# Patient Record
Sex: MALE | Race: WHITE | Employment: FULL TIME | URBAN - METROPOLITAN AREA
[De-identification: names, ages, dates, MRNs, and addresses within clinical notes are randomized per-mention and may not be internally consistent; named-entity substitution may affect disease eponyms.]

---

## 2024-01-19 ENCOUNTER — HOSPITAL ENCOUNTER (EMERGENCY)
Facility: HOSPITAL | Age: 34
Discharge: HOME | End: 2024-01-19
Attending: INTERNAL MEDICINE
Payer: COMMERCIAL

## 2024-01-19 VITALS
RESPIRATION RATE: 18 BRPM | DIASTOLIC BLOOD PRESSURE: 91 MMHG | OXYGEN SATURATION: 100 % | SYSTOLIC BLOOD PRESSURE: 166 MMHG | TEMPERATURE: 98.4 F | BODY MASS INDEX: 27.47 KG/M2 | HEIGHT: 67 IN | HEART RATE: 64 BPM | WEIGHT: 175 LBS
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
## 2024-01-19 PROCEDURE — 99283 EMERGENCY DEPT VISIT LOW MDM: CPT | Performed by: INTERNAL MEDICINE

## 2024-01-19 PROCEDURE — 2500000001 HC RX 250 WO HCPCS SELF ADMINISTERED DRUGS (ALT 637 FOR MEDICARE OP): Performed by: INTERNAL MEDICINE

## 2024-01-19 RX ORDER — METRONIDAZOLE 500 MG/1
500 TABLET ORAL ONCE
Status: COMPLETED | OUTPATIENT
Start: 2024-01-19 | End: 2024-01-19

## 2024-01-19 RX ORDER — DOXYCYCLINE HYCLATE 100 MG
100 TABLET ORAL ONCE
Status: COMPLETED | OUTPATIENT
Start: 2024-01-19 | End: 2024-01-19

## 2024-01-19 RX ORDER — DOXYCYCLINE 100 MG/1
100 CAPSULE ORAL 2 TIMES DAILY
Qty: 20 CAPSULE | Refills: 0 | Status: SHIPPED | OUTPATIENT
Start: 2024-01-19 | End: 2024-01-29

## 2024-01-19 RX ORDER — METRONIDAZOLE 500 MG/1
500 TABLET ORAL 3 TIMES DAILY
Qty: 30 TABLET | Refills: 0 | Status: SHIPPED | OUTPATIENT
Start: 2024-01-19 | End: 2024-01-29

## 2024-01-19 RX ADMIN — DOXYCYCLINE HYCLATE 100 MG: 100 TABLET, COATED ORAL at 22:09

## 2024-01-19 RX ADMIN — METRONIDAZOLE 500 MG: 500 TABLET ORAL at 22:09

## 2024-01-19 ASSESSMENT — PAIN - FUNCTIONAL ASSESSMENT: PAIN_FUNCTIONAL_ASSESSMENT: 0-10

## 2024-01-19 ASSESSMENT — PAIN SCALES - GENERAL: PAINLEVEL_OUTOF10: 7

## 2024-01-19 ASSESSMENT — COLUMBIA-SUICIDE SEVERITY RATING SCALE - C-SSRS
6. HAVE YOU EVER DONE ANYTHING, STARTED TO DO ANYTHING, OR PREPARED TO DO ANYTHING TO END YOUR LIFE?: NO
2. HAVE YOU ACTUALLY HAD ANY THOUGHTS OF KILLING YOURSELF?: NO
1. IN THE PAST MONTH, HAVE YOU WISHED YOU WERE DEAD OR WISHED YOU COULD GO TO SLEEP AND NOT WAKE UP?: NO

## 2024-01-20 NOTE — ED PROVIDER NOTES
HPI   Chief Complaint   Patient presents with    Animal Bite       Patient presenting for evaluation of a dog bite.  Patient notes that he was out for a run yesterday near his [place] when one of the neighbors from the condo was walking their dog.  The neighbor instructed the patient to keep their distance however the patient believes he was too close to the person walking their dog.  The dog must of got away from the owner subsequently bit the patient 1 time in the chest 1 time in the right buttock and 1 time in the left thigh.  Patient denies fever or chills.  Patient denies drainage from the site.  Patient states he had a tetanus shot updated today at [facility].                          Barney Coma Scale Score: 15                  Patient History   No past medical history on file.  No past surgical history on file.  No family history on file.  Social History     Tobacco Use    Smoking status: Not on file    Smokeless tobacco: Not on file   Substance Use Topics    Alcohol use: Not on file    Drug use: Not on file       Physical Exam   ED Triage Vitals [01/19/24 1946]   Temp Heart Rate Respirations BP   36.9 °C (98.4 °F) 64 18 (!) 166/91      Pulse Ox Temp src Heart Rate Source Patient Position   100 % -- -- --      BP Location FiO2 (%)     -- --       Physical Exam  Vitals and nursing note reviewed.   Constitutional:       Appearance: Normal appearance.   HENT:      Head: Atraumatic.      Right Ear: External ear normal.      Left Ear: External ear normal.      Nose: Nose normal.      Mouth/Throat:      Mouth: Mucous membranes are moist.      Pharynx: Oropharynx is clear.   Eyes:      Extraocular Movements: Extraocular movements intact.      Pupils: Pupils are equal, round, and reactive to light.   Cardiovascular:      Rate and Rhythm: Normal rate and regular rhythm.      Pulses: Normal pulses.   Pulmonary:      Effort: Pulmonary effort is normal.      Breath sounds: Normal breath sounds.   Abdominal:       Palpations: Abdomen is soft.      Tenderness: There is no abdominal tenderness.   Musculoskeletal:         General: No tenderness or signs of injury. Normal range of motion.      Cervical back: Normal range of motion and neck supple. No rigidity or tenderness.   Skin:     General: Skin is warm and dry.          Neurological:      General: No focal deficit present.      Mental Status: He is alert and oriented to person, place, and time. Mental status is at baseline.   Psychiatric:         Mood and Affect: Mood normal.         Behavior: Behavior normal.         ED Course & MDM   Diagnoses as of 01/19/24 2300   Dog bite, initial encounter       Medical Decision Making  Differential diagnosis: Dog bite, cellulitis, wound infection, rabies, tetanus, other    Patient presenting for evaluation of a dog bite.  Patient notes that he was out for a run yesterday near his [place] when one of the neighbors from the [place] was walking their dog.  The neighbor instructed the patient to keep their distance however the patient believes he was too close to the person walking their dog.  The dog must of got away from the owner subsequently bit the patient 1 time in the chest 1 time in the right buttock and 1 time in the left thigh.  Patient denies fever or chills.  Patient denies drainage from the site.  Patient states he had a tetanus shot updated today at [facility].    Discussed rabies with the patient.  Given that the patient was bitten by dog that was domesticated and known to be a neighbors pet odds are very low of previous infection.  Also discussed with the patient that this would not be considered an unprovoked attack as the patient was running near the dog.  Rabies vaccination and immunoglobulin was discussed as something he could offer however the odds are extremely low for rabies infection.  Patient agrees with plan of treatment with antibiotics.  Patient notes he has an allergy to penicillin.  Will treat with doxycycline  and Flagyl.  Patient agrees to follow-up as outpatient.  Discussed return precautions including erythema, drainage, red streaks, fevers, or other concerns.        Procedure  Procedures     Melvin Duarte DO  01/19/24 8205

## 2024-02-21 ENCOUNTER — OFFICE VISIT (OUTPATIENT)
Dept: PRIMARY CARE | Facility: CLINIC | Age: 34
End: 2024-02-21
Payer: COMMERCIAL

## 2024-02-21 VITALS
OXYGEN SATURATION: 100 % | BODY MASS INDEX: 27.15 KG/M2 | SYSTOLIC BLOOD PRESSURE: 106 MMHG | WEIGHT: 173 LBS | HEIGHT: 67 IN | HEART RATE: 62 BPM | DIASTOLIC BLOOD PRESSURE: 66 MMHG

## 2024-02-21 DIAGNOSIS — Z00.00 HEALTH CARE MAINTENANCE: Primary | ICD-10-CM

## 2024-02-21 DIAGNOSIS — Z91.89 AT RISK FOR FERTILITY PROBLEMS: ICD-10-CM

## 2024-02-21 PROBLEM — Z86.19 HISTORY OF CHICKEN POX: Status: ACTIVE | Noted: 2024-02-21

## 2024-02-21 PROBLEM — F90.9 ATTENTION DEFICIT HYPERACTIVITY DISORDER (ADHD): Status: ACTIVE | Noted: 2024-02-21

## 2024-02-21 PROCEDURE — 1036F TOBACCO NON-USER: CPT | Performed by: INTERNAL MEDICINE

## 2024-02-21 PROCEDURE — 99385 PREV VISIT NEW AGE 18-39: CPT | Performed by: INTERNAL MEDICINE

## 2024-02-21 ASSESSMENT — PATIENT HEALTH QUESTIONNAIRE - PHQ9
2. FEELING DOWN, DEPRESSED OR HOPELESS: NOT AT ALL
SUM OF ALL RESPONSES TO PHQ9 QUESTIONS 1 AND 2: 0
1. LITTLE INTEREST OR PLEASURE IN DOING THINGS: NOT AT ALL

## 2024-02-21 NOTE — PROGRESS NOTES
"Subjective   Patient ID: Arian Banks is a 34 y.o. male who presents for Establish Care.        HPI   Patient is a 34-year-old male with past medical history of fertility issues who presents for wellness exam.  No specific complaints at this time.  He is interested in seeing a urologist at [facility] for fertility.  Previously saw provider at [facility].  He has no children at this time.  He has 1 partner.     He is an avid runner.  Denies illicit substances smoking or alcohol.  No early colon cancer or heart disease in the family.    Eats healthfully      Review of Systems  Constitutional: No fever or chills, No Night Sweats  Eyes: No Blurry Vision or Eye sight problems  ENT: No Nasal Discharge, Hoarseness, sore throat  Cardiovascular: no chest pain, no palpitations and no syncope.   Respiratory: no cough, no shortness of breath during exertion and no shortness of breath at rest.   Gastrointestinal: no abdominal pain, no nausea and no vomiting.   : No issues with urinary stream, burning with urination, no blood in urine or stools  Skin: No Skin rashes or Lesions  Neuro: No Headache, no dizziness or Numbness or tingling  Psych: No Anxiety, depression or sleeping problems  Heme: No Easy bleeding or brusing.     Objective   /66   Pulse 62   Ht 1.702 m (5' 7\")   Wt 78.5 kg (173 lb)   SpO2 100%   BMI 27.10 kg/m²     Physical Exam  Constitutional: Alert and in no acute distress. Well developed, well nourished.   Head and Face: Head and face: Normal.    Eyes: Normal external exam. Pupils were equal in size, round, reactive to light (PERRL) with normal accommodation and extraocular movements intact (EOMI).   Ears, Nose, Mouth, and Throat: External inspection of ears and nose: Normal.  Hearing: Normal.  Nasal mucosa, septum, and turbinates: Normal.  Lips, teeth, and gums: Normal.  Oropharynx: Normal.   Neck: No neck mass was observed. Supple. Thyroid not enlarged and there were no palpable " "thyroid nodules.   Cardiovascular: Heart rate and rhythm were normal, normal S1 and S2. Pedal pulses: Normal. No peripheral edema.   Pulmonary: No respiratory distress. Clear bilateral breath sounds.   Abdomen: Soft nontender; no abdominal mass palpated. Normal bowel sounds. No organomegaly.   : Deferred  Musculoskeletal: No joint swelling seen, normal movements of all extremities. Range of motion: Normal.  Muscle strength/tone: Normal.    Skin: Normal skin color and pigmentation, normal skin turgor, and no rash.   Neurologic: Deep tendon reflexes were 2+ and symmetric.   Psychiatric: Judgment and insight: Intact. Mood and affect: Normal.  Lymphatic: No cervical lymphadenopathy. Palpation of lymph nodes in axillae: Normal.  Palpation of lymph nodes in groin: Normal.    No results found for: \"WBC\", \"HGB\", \"HCT\", \"PLT\", \"CHOL\", \"TRIG\", \"HDL\", \"LDLDIRECT\", \"ALT\", \"AST\", \"NA\", \"K\", \"CL\", \"CREATININE\", \"BUN\", \"CO2\", \"TSH\", \"PSA\", \"INR\", \"GLUF\", \"HGBA1C\", \"ALBUR\"    No image results found.      Assessment/Plan   Problem List Items Addressed This Visit    None  Visit Diagnoses         Codes    [facility] maintenance    -  Primary Z00.00    Relevant Orders    CBC    Comprehensive metabolic panel    Lipid Panel    At risk for fertility problems     Z91.89    Relevant Orders    Referral to Urology              Dear Arian Banks     It was my pleasure to take care of you today in the office. Below are the things we discussed today:    1. Immunizations: Yearly Flu shot is recommended.          a: COVID: Up-to-date         b: Tetanus: Up-to-date      2. Blood Work: Ordered  3. Seen your dentist twice a year  4. Yearly Eye exam is recommended    5. BMI: 27.1  6: Diet recommendations:   Eat Clean, Try to have as many home cooked meals as possible  Avoid processed foods which contain excess calories, sugar, and sodium.    7. Exercise recommendations:   150 minutes a week to maintain your weight     If you have to loose weight, " you need a better diet and exercise plan.     8. Please get your Living will / Advance directive completed if you do not have one already. Please make sure our office has a copy of the latest one.     9. Colonoscopy: Start at age 45  10. PSA: Start at age 50    11.     Follow up in one year for a Physical. Please call the office before your Physical to see if you need blood work completed prior to your physical.     Please call me if any questions arise from now until your next visit. I will call you after I am done seeing patients. A Doctor is always available by phone when the office is closed. Please feel free to call for help with any problem that you feel shouldn't wait until the office re-opens.     [place], DO

## 2024-02-27 ENCOUNTER — APPOINTMENT (OUTPATIENT)
Dept: PRIMARY CARE | Facility: CLINIC | Age: 34
End: 2024-02-27
Payer: COMMERCIAL

## 2024-03-12 ENCOUNTER — APPOINTMENT (OUTPATIENT)
Dept: PRIMARY CARE | Facility: CLINIC | Age: 34
End: 2024-03-12
Payer: COMMERCIAL

## 2024-04-17 ENCOUNTER — APPOINTMENT (OUTPATIENT)
Dept: PRIMARY CARE | Facility: CLINIC | Age: 34
End: 2024-04-17
Payer: COMMERCIAL

## 2024-05-15 ENCOUNTER — OFFICE VISIT (OUTPATIENT)
Dept: PRIMARY CARE | Facility: CLINIC | Age: 34
End: 2024-05-15
Payer: COMMERCIAL

## 2024-05-15 VITALS
HEART RATE: 55 BPM | DIASTOLIC BLOOD PRESSURE: 72 MMHG | HEIGHT: 68 IN | WEIGHT: 171 LBS | OXYGEN SATURATION: 97 % | SYSTOLIC BLOOD PRESSURE: 115 MMHG | BODY MASS INDEX: 25.91 KG/M2

## 2024-05-15 DIAGNOSIS — Z00.00 HEALTH CARE MAINTENANCE: Primary | ICD-10-CM

## 2024-05-15 PROCEDURE — 99395 PREV VISIT EST AGE 18-39: CPT | Performed by: INTERNAL MEDICINE

## 2024-05-15 PROCEDURE — 1036F TOBACCO NON-USER: CPT | Performed by: INTERNAL MEDICINE

## 2024-05-15 ASSESSMENT — PROMIS GLOBAL HEALTH SCALE
RATE_QUALITY_OF_LIFE: VERY GOOD
RATE_MENTAL_HEALTH: VERY GOOD
RATE_PHYSICAL_HEALTH: VERY GOOD
CARRYOUT_PHYSICAL_ACTIVITIES: COMPLETELY
CARRYOUT_SOCIAL_ACTIVITIES: VERY GOOD
RATE_AVERAGE_FATIGUE: MILD
RATE_AVERAGE_PAIN: 0
EMOTIONAL_PROBLEMS: SOMETIMES
RATE_GENERAL_HEALTH: VERY GOOD
RATE_SOCIAL_SATISFACTION: VERY GOOD

## 2024-05-15 NOTE — PROGRESS NOTES
"Subjective   Patient ID: Arian Banks is a 34 y.o. male who presents for Annual Exam.          HPI     Patient is a 34-year-old male with past medical history of poor sleeping habits presents for wellness.  No specific complaints at this time.  He does report getting up in the melanite to go to the bathroom.  After he gets up it is difficult for him to go back to sleep.  Patient is  no children.  They are trying to have kids.    Denies illicit substances or smoking.  Exercises occasionally.    Review of Systems  Constitutional: No fever or chills, No Night Sweats  Eyes: No Blurry Vision or Eye sight problems  ENT: No Nasal Discharge, Hoarseness, sore throat  Cardiovascular: no chest pain, no palpitations and no syncope.   Respiratory: no cough, no shortness of breath during exertion and no shortness of breath at rest.   Gastrointestinal: no abdominal pain, no nausea and no vomiting.   : No issues with urinary stream, burning with urination, no blood in urine or stools  Skin: No Skin rashes or Lesions  Neuro: No Headache, no dizziness or Numbness or tingling  Psych: No Anxiety, depression or sleeping problems  Heme: No Easy bleeding or brusing.     Objective   /72   Pulse 55   Ht 1.715 m (5' 7.5\")   Wt 77.6 kg (171 lb)   SpO2 97%   BMI 26.39 kg/m²     Physical Exam  Constitutional: Alert and in no acute distress. Well developed, well nourished.   Head and Face: Head and face: Normal.    Eyes: Normal external exam. Pupils were equal in size, round, reactive to light (PERRL) with normal accommodation and extraocular movements intact (EOMI).   Ears, Nose, Mouth, and Throat: External inspection of ears and nose: Normal.  Hearing: Normal.  Nasal mucosa, septum, and turbinates: Normal.  Lips, teeth, and gums: Normal.  Oropharynx: Normal.   Neck: No neck mass was observed. Supple. Thyroid not enlarged and there were no palpable thyroid nodules.   Cardiovascular: Heart rate and rhythm were normal, normal " "S1 and S2. Pedal pulses: Normal. No peripheral edema.   Pulmonary: No respiratory distress. Clear bilateral breath sounds.   Abdomen: Soft nontender; no abdominal mass palpated. Normal bowel sounds. No organomegaly.   : Deferred  Musculoskeletal: No joint swelling seen, normal movements of all extremities. Range of motion: Normal.  Muscle strength/tone: Normal.    Skin: Normal skin color and pigmentation, normal skin turgor, and no rash.   Neurologic: Deep tendon reflexes were 2+ and symmetric.   Psychiatric: Judgment and insight: Intact. Mood and affect: Normal.  Lymphatic: No cervical lymphadenopathy. Palpation of lymph nodes in axillae: Normal.  Palpation of lymph nodes in groin: Normal.    No results found for: \"WBC\", \"HGB\", \"HCT\", \"PLT\", \"CHOL\", \"TRIG\", \"HDL\", \"LDLDIRECT\", \"ALT\", \"AST\", \"NA\", \"K\", \"CL\", \"CREATININE\", \"BUN\", \"CO2\", \"TSH\", \"PSA\", \"INR\", \"GLUF\", \"HGBA1C\", \"ALBUR\"    No image results found.      Assessment/Plan   Problem List Items Addressed This Visit    None  Visit Diagnoses         Codes    [facility] maintenance    -  Primary Z00.00    Relevant Orders    CBC    Comprehensive metabolic panel    Lipid Panel    TSH with reflex to Free T4 if abnormal              Dear Arian Banks     It was my pleasure to take care of you today in the office. Below are the things we discussed today:    1. Immunizations: Yearly Flu shot is recommended.          a: COVID: Up-to-date         b: Tetanus: Up-to-date      2. Blood Work: Ordered  3. Seen your dentist twice a year  4. Yearly Eye exam is recommended    5. BMI: 26.4  6: Diet recommendations:   Eat Clean, Try to have as many home cooked meals as possible  Avoid processed foods which contain excess calories, sugar, and sodium.    7. Exercise recommendations:   150 minutes a week to maintain your weight     If you have to loose weight, you need a better diet and exercise plan.     8. Please get your Living will / Advance directive completed if you do not " have one already. Please make sure our office has a copy of the latest one.     9. Colonoscopy: Start at age 45  10. PSA: Start at age 50    11.     Follow up in one year for a Physical. Please call the office before your Physical to see if you need blood work completed prior to your physical.     Please call me if any questions arise from now until your next visit. I will call you after I am done seeing patients. A Doctor is always available by phone when the office is closed. Please feel free to call for help with any problem that you feel shouldn't wait until the office re-opens.     [place], DO

## 2024-07-22 ENCOUNTER — OFFICE VISIT (OUTPATIENT)
Dept: UROLOGY | Facility: HOSPITAL | Age: 34
End: 2024-07-22
Payer: COMMERCIAL

## 2024-07-22 DIAGNOSIS — N46.11 OLIGOSPERMIA: Primary | ICD-10-CM

## 2024-07-22 PROCEDURE — 99214 OFFICE O/P EST MOD 30 MIN: CPT | Performed by: UROLOGY

## 2024-07-22 PROCEDURE — 99204 OFFICE O/P NEW MOD 45 MIN: CPT | Performed by: UROLOGY

## 2024-07-22 NOTE — PROGRESS NOTES
"HPI:  34 y.o. yo male  referred to me by Jaya Iglesias for infertility    Partner/wife name: Anjali Banks  Partner/wife  : 1994  Attempting Pregnancy for : 24 months  Previous pregnancies for either partner: none    Previous Semen analysis / Labs:   Volume - 7.00 (>=1.50 mL)  pH - 7.4 (>=7.2)  Color - yellow  Viscosity - normal  Concentration - 0.40 (>=15.00 M/mL)  % motile - 80 (>=40%)  Forward progression - 2 poor to moderate, erratic  Total motile - 2.24    Testosterone total (24) - 510  Estradiol 17B (23) - < 25  LH (23) - 6.4  FSH BLD (23) - 5.4    Chromosome analysis 24:  Number of cells counted: 20   Number of cells analyzed: 6   Number of cells karyotyped: 6   Banding resolution: 550   Banding method: G-banding     DIAGNOSIS: 46,XY     INTERPRETATION:  Normal, male karyotype     Chromosome-Y microde 24:  Negative: None of the azoospermia factor (AZF) regions tested were   found to be deleted from the Y chromosome. Azoospermia,   oligospermia or male infertility related to other genetic causes   has not been excluded (e.g. aneuploidy, chromosome rearrangements,   or mutations within individual genes included in the AZF regions).    Chromosome analysis should be considered to exclude 47,XXY   (Kleinfelter syndrome) and other cytogenetic abnormalities which   may result in non-obstructive azoospermia or oligospermia.     No results found for: \"TESTOSTERONE\"  No results found for: \"LH\"  No results found for: \"FSH\"  No components found for: \"ESTRADIAL\"  No results found for: \"PROLACTIN\"    PREVIOUS RISK FACTORS  History of testicular exposure to chemicals, radiation, or toxins: None  History of high fever, epididymitis, orchitis, prostatitis, sexually transmitted diseases, or trauma to the testicles: None  History of a varicocele, testicular torsion, cryptorchidism, postpubertile mumps or a family history of infertility: None  Coital Frequency:   Coital " Lubricants: None  Problems with erections or ejaculation: None  Smoker? No, but wife is a smoker.  Previous Testosterone or anabolic steroid Use: none    Notes from 2023 reviewed today.     PRIOR Assisted Reproductive Technology:  None      PMH:  No past medical history on file.     PSH:  No past surgical history on file.     Medications:  No current outpatient medications on file.    Allergy:  Allergies   Allergen Reactions    Penicillins Unknown and Rash        Exam  Testicles descended bilaterally, nontender, no masses  Vasa palpable bilaterally  Penis uncirc'd, no lesions, no plaques      Assessment/Plan  #infertility, severe oligospermia     I counseled the patient in detail regarding infertility, specifically the male component. We reviewed different causes of male infertility as well as options to optimize male fertility, to different surgical interventions and assisted reproductive technologies.    Semen analysis with strict morphology   Discussed IUI versus IUF. Will likely need IVF based on sperm count.  Fu after SA completed    Scribe Attestation  By signing my name below, IKayleigh Scribe   attest that this documentation has been prepared under the direction and in the presence of Inga Richards MD.

## 2024-08-06 ENCOUNTER — ANCILLARY PROCEDURE (OUTPATIENT)
Dept: ENDOCRINOLOGY | Facility: CLINIC | Age: 34
End: 2024-08-06

## 2024-08-06 DIAGNOSIS — N46.11 OLIGOSPERMIA: ICD-10-CM

## 2024-08-06 LAB
ABSTINENCE (DAYS): 4 DAYS (ref 2–7)
AGGLUTINATION (SEMEN): NO
ANALYZED TIME:: ABNORMAL
ANDROLOGY LAB ID#: ABNORMAL
CLUMPS (SEMEN): NO
COLLECTED COMPLETELY: YES
COLLECTION LOCATION:: ABNORMAL
COLLECTION METHOD:: ABNORMAL
CONCENTRATION(SEMEN): 0.34 MILL/ML (ref 15–?)
DEBRIS (SEMEN): YES
NON PROG. MOTILITY (SEMEN): 7 %
PROG. MOTILITY (SEMEN): 61 % (ref 32–?)
RECEIVED TIME:: ABNORMAL
SEMEN APPEARANCE: NORMAL
SEMEN LIQUEFACTION: NORMAL
SEMEN VISCOSITY: NORMAL
TOTAL MOTILITY (SEMEN): 68 % (ref 40–?)
TOTAL NO OF MOTILE (SEMEN): 1.26 MILL
TOTAL NO OF SPERM (SEMEN): 1.87 MILL (ref 39–?)
VOLUME (SEMEN): 5.5 ML (ref 1.5–?)

## 2024-08-12 ENCOUNTER — APPOINTMENT (OUTPATIENT)
Dept: UROLOGY | Facility: CLINIC | Age: 34
End: 2024-08-12
Payer: COMMERCIAL

## 2024-08-21 NOTE — PROGRESS NOTES
Virtual or Telephone Consent    An interactive audio and video telecommunication system which permits real time communications between the patient (at the originating site) and provider (at the distant site) was utilized to provide this telehealth service.   Patient provided consent    HPI:  34 y.o. yo male  referred to me by Jaya Iglesias for infertility     Last visit  -Semen analysis with strict morphology   -Discussed IUI versus IUF. Will likely need IVF based on sperm count.    Today's visit:  #Infertility/severe oligospermia     Partner/wife name: Anjali Banks  Partner/wife  : 1994  Attempting Pregnancy for : 24 months  Previous pregnancies for either partner: none     Previous Semen analysis / Labs:   Volume - 7.00 (>=1.50 mL)  pH - 7.4 (>=7.2)  Color - yellow  Viscosity - normal  Concentration - 0.40 (>=15.00 M/mL)  % motile - 80 (>=40%)  Forward progression - 2 poor to moderate, erratic  Total motile - 2.24     Testosterone total (24) - 510  Estradiol 17B (23) - < 25  LH (23) - 6.4  FSH BLD (23) - 5.4     Chromosome analysis 24:  Number of cells counted: 20   Number of cells analyzed: 6   Number of cells karyotyped: 6   Banding resolution: 550   Banding method: G-banding       PREVIOUS RISK FACTORS  History of testicular exposure to chemicals, radiation, or toxins: None  History of high fever, epididymitis, orchitis, prostatitis, sexually transmitted diseases, or trauma to the testicles: None  History of a varicocele, testicular torsion, cryptorchidism, postpubertile mumps or a family history of infertility: None  Coital Frequency:   Coital Lubricants: None  Problems with erections or ejaculation: None  Smoker? No, but wife is a smoker.  Previous Testosterone or anabolic steroid Use: none    Labs  Component      Latest Ref Rng 2024   [facility]rology Lab ID# [id number]    Semen Appearance Normal    Semen Viscosity Normal    Semen Liquefaction Normal    Debris (Semen)  Yes    Clumps (Semen) No    Agglutination (Semen) No    Volume (Semen)      1.5 mL 5.5    Concentration(Semen)      15 mill/mL 0.34 !    Total Motility (Semen)      40 % 68    Prog. Motility (Semen)      32 % 61    Non Prog. Motility (Semen)      % 7    Total No of Sperm (Semen)      39 mill 1.87 !    Total No of Motile (Semen)      mill 1.26    Abstinence (days)      2 - 7 days 4    Collected Completely Yes    Collection Location Center    Collection Method [place]    Received time 02:32 pm    Analyzed Time 02:48 pm        PMH:  No past medical history on file.     PSH:  No past surgical history on file.     Medications:  No current outpatient medications on file.    Allergy:  Allergies   Allergen Reactions    Penicillins Unknown and Rash        Exam  CONSTITUTIONAL:        No acute distress    HEAD:        Normocephalic and atraumatic    CHEST / RESPIRATORY      no excess work of breathing, no respiratory distress,    ABDOMEN / GASTROINTESTINAL:        Abdomen nondistended          Assessment/Plan  #Infertility/severe oligospermia   Discussed IUI versus IUF. Will need IVF based on sperm count.  Discussed r/b/a of IVF  Rec following up with  IVF docs     Scribe Attestation  By signing my name below, I, Anna Cuevas, attest that this documentation  has been prepared under the direction and in the presence of Inga Richards MD.

## 2024-08-23 ENCOUNTER — TELEMEDICINE (OUTPATIENT)
Dept: UROLOGY | Facility: CLINIC | Age: 34
End: 2024-08-23
Payer: COMMERCIAL

## 2024-08-23 DIAGNOSIS — N46.9 INFERTILITY MALE: Primary | ICD-10-CM

## 2024-08-23 DIAGNOSIS — N46.11 OLIGOSPERMIA: ICD-10-CM

## 2024-08-23 PROCEDURE — 99213 OFFICE O/P EST LOW 20 MIN: CPT | Performed by: UROLOGY

## 2024-08-30 ENCOUNTER — APPOINTMENT (OUTPATIENT)
Dept: UROLOGY | Facility: CLINIC | Age: 34
End: 2024-08-30
Payer: COMMERCIAL

## 2024-10-11 ENCOUNTER — OFFICE VISIT (OUTPATIENT)
Dept: PRIMARY CARE | Facility: CLINIC | Age: 34
End: 2024-10-11
Payer: COMMERCIAL

## 2024-10-11 VITALS
OXYGEN SATURATION: 98 % | HEART RATE: 67 BPM | BODY MASS INDEX: 27.93 KG/M2 | SYSTOLIC BLOOD PRESSURE: 133 MMHG | WEIGHT: 181 LBS | DIASTOLIC BLOOD PRESSURE: 89 MMHG
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
## 2024-10-11 DIAGNOSIS — F51.01 PRIMARY INSOMNIA: ICD-10-CM

## 2024-10-11 PROCEDURE — 1036F TOBACCO NON-USER: CPT | Performed by: INTERNAL MEDICINE

## 2024-10-11 PROCEDURE — 99213 OFFICE O/P EST LOW 20 MIN: CPT | Performed by: INTERNAL MEDICINE

## 2024-10-11 RX ORDER — TRAZODONE HYDROCHLORIDE 50 MG/1
50 TABLET ORAL NIGHTLY PRN
Qty: 10 TABLET | Refills: 0 | Status: SHIPPED | OUTPATIENT
Start: 2024-10-11 | End: 2025-10-11

## 2024-10-11 RX ORDER — MUPIROCIN CALCIUM 20 MG/G
CREAM TOPICAL 3 TIMES DAILY
Qty: 15 G | Refills: 0 | Status: SHIPPED | OUTPATIENT
Start: 2024-10-11 | End: 2024-10-12

## 2024-10-11 NOTE — PROGRESS NOTES
"Subjective   Patient ID: Arian Banks is a 34 y.o. male who presents for Animal Bite and Insomnia.    HPI     Patient is a 34-year-old male who presents with chief complaint of a bug bite in the left antecubital fossa.  Occurred 3 days ago.  The area is slightly red but not extending beyond the area of the insect bite.  There is no drainage.    Review of Systems  Constitutional: No fever or chills  Cardiovascular: no chest pain, no palpitations and no syncope.   Respiratory: no cough, no shortness of breath during exertion and no shortness of breath at rest.   Gastrointestinal: no abdominal pain, no nausea and no vomiting.  Neuro: No Headache, no dizziness    Objective   /89   Pulse 67   Wt 82.1 kg (181 lb)   SpO2 98%   BMI 27.93 kg/m²     Physical Exam  Constitutional: Alert and in no acute distress. Well developed, well nourished  Head and Face: Head and face: Normal.    Cardiovascular: Heart rate and rhythm were normal, normal S1 and S2. No peripheral edema.   Pulmonary: No respiratory distress. Clear bilateral breath sounds.  Musculoskeletal: Gait and station: Normal. Muscle strength/tone: Normal.   Skin: Erythema around the area of the insect bite but not tracking up the arm.  Not warm.  Psychiatric: Judgment and insight: Intact. Mood and affect: Normal.    Procedures    No results found for: \"WBC\", \"HGB\", \"HCT\", \"PLT\", \"CHOL\", \"TRIG\", \"HDL\", \"LDLDIRECT\", \"ALT\", \"AST\", \"NA\", \"K\", \"CL\", \"CREATININE\", \"BUN\", \"CO2\", \"TSH\", \"PSA\", \"INR\", \"GLUF\", \"HGBA1C\", \"ALBUR\"    No image results found.            Assessment/Plan   Problem List Items Addressed This Visit    None  Visit Diagnoses         Codes    Bug bite, sequela    -  Primary W57.XXXS    Relevant Medications    mupirocin (Bactroban) 2 % cream    Primary insomnia     F51.01    Relevant Medications    traZODone (Desyrel) 50 mg tablet          Likely a local site reaction to the insect bite.  Recommend conservative care.  Will provide mupirocin to " prevent bacterial infection.  Apply 3 times daily for 5 to 10 days.    Follow-up if no improvement or if symptoms are getting worse

## 2024-10-12 RX ORDER — MUPIROCIN 20 MG/G
OINTMENT TOPICAL
Qty: 22 G | Refills: 0 | Status: SHIPPED | OUTPATIENT
Start: 2024-10-12

## 2024-12-09 ENCOUNTER — APPOINTMENT (OUTPATIENT)
Dept: ENDOCRINOLOGY | Facility: CLINIC | Age: 34
End: 2024-12-09
Payer: COMMERCIAL

## 2025-02-12 ENCOUNTER — APPOINTMENT (OUTPATIENT)
Dept: PRIMARY CARE | Facility: CLINIC | Age: 35
End: 2025-02-12
Payer: COMMERCIAL

## 2025-02-12 VITALS
BODY MASS INDEX: 28.24 KG/M2 | HEART RATE: 71 BPM | SYSTOLIC BLOOD PRESSURE: 124 MMHG | WEIGHT: 183 LBS | OXYGEN SATURATION: 98 % | DIASTOLIC BLOOD PRESSURE: 77 MMHG

## 2025-02-12 DIAGNOSIS — F41.9 ANXIETY: Primary | ICD-10-CM

## 2025-02-12 DIAGNOSIS — F51.01 PRIMARY INSOMNIA: ICD-10-CM

## 2025-02-12 PROCEDURE — 1036F TOBACCO NON-USER: CPT | Performed by: INTERNAL MEDICINE

## 2025-02-12 PROCEDURE — 99213 OFFICE O/P EST LOW 20 MIN: CPT | Performed by: INTERNAL MEDICINE

## 2025-02-12 RX ORDER — SERTRALINE HYDROCHLORIDE 25 MG/1
25 TABLET, FILM COATED ORAL DAILY
Qty: 30 TABLET | Refills: 1 | Status: SHIPPED | OUTPATIENT
Start: 2025-02-12 | End: 2025-02-13 | Stop reason: WASHOUT

## 2025-02-12 RX ORDER — TRAZODONE HYDROCHLORIDE 50 MG/1
50 TABLET ORAL NIGHTLY PRN
Qty: 30 TABLET | Refills: 0 | Status: SHIPPED | OUTPATIENT
Start: 2025-02-12

## 2025-02-12 NOTE — PROGRESS NOTES
"Subjective   Patient ID: Arian Banks is a 35 y.o. male who presents for Follow-up and Insomnia.    HPI     Patient is a 35-year-old male who presents with chief complaint of insomnia and anxiety.  He states that this past year has been tremendously difficult on him.  Most recently he had to put on his cat.  He is having trouble sleeping.  He feels palpitations and chest pain.  It comes and goes.  Worse with anxiety and stress.    Review of Systems  Constitutional: No fever or chills  Cardiovascular: no chest pain, no palpitations and no syncope.   Respiratory: no cough, no shortness of breath during exertion and no shortness of breath at rest.   Gastrointestinal: no abdominal pain, no nausea and no vomiting.  Neuro: No Headache, no dizziness    Objective   /77   Pulse 71   Wt 83 kg (183 lb)   SpO2 98%   BMI 28.24 kg/m²     Physical Exam  Constitutional: Alert and in no acute distress. Well developed, well nourished  Head and Face: Head and face: Normal.    Cardiovascular: Heart rate and rhythm were normal, normal S1 and S2. No peripheral edema.   Pulmonary: No respiratory distress. Clear bilateral breath sounds.  Musculoskeletal: Gait and station: Normal. Muscle strength/tone: Normal.   Skin: Normal skin color and pigmentation, normal skin turgor, and no rash.    Psychiatric: Judgment and insight: Intact. Mood and affect: Normal.    Procedures    No results found for: \"WBC\", \"HGB\", \"HCT\", \"PLT\", \"CHOL\", \"TRIG\", \"HDL\", \"LDLDIRECT\", \"ALT\", \"AST\", \"NA\", \"K\", \"CL\", \"CREATININE\", \"BUN\", \"CO2\", \"TSH\", \"PSA\", \"INR\", \"GLUF\", \"HGBA1C\", \"ALBUR\"    No image results found.            Assessment/Plan   Problem List Items Addressed This Visit    None  Visit Diagnoses         Codes    Anxiety    -  Primary F41.9    Relevant Medications    sertraline (Zoloft) 25 mg tablet    Other Relevant Orders    Referral to Psychology    Follow Up In [facility] - PCP - Established    Primary insomnia     F51.01    " Relevant Medications    traZODone (Desyrel) 50 mg tablet    sertraline (Zoloft) 25 mg tablet    Other Relevant Orders    Follow Up In [facility] - PCP - Established          At this point we will start trazodone for sleep as well as start sertraline 25 mg once daily and follow-up 30 days for reevaluation.

## 2025-02-13 DIAGNOSIS — F90.2 ATTENTION DEFICIT HYPERACTIVITY DISORDER (ADHD), COMBINED TYPE: Primary | ICD-10-CM

## 2025-02-13 RX ORDER — DULOXETIN HYDROCHLORIDE 30 MG/1
30 CAPSULE, DELAYED RELEASE ORAL DAILY
Qty: 30 CAPSULE | Refills: 0 | Status: SHIPPED | OUTPATIENT
Start: 2025-02-13 | End: 2025-03-15

## 2025-02-17 ENCOUNTER — APPOINTMENT (OUTPATIENT)
Dept: CARDIOLOGY | Facility: HOSPITAL | Age: 35
End: 2025-02-17
Payer: COMMERCIAL

## 2025-02-17 ENCOUNTER — HOSPITAL ENCOUNTER (EMERGENCY)
Facility: HOSPITAL | Age: 35
Discharge: HOME | End: 2025-02-17
Attending: STUDENT IN AN ORGANIZED HEALTH CARE EDUCATION/TRAINING PROGRAM
Payer: COMMERCIAL

## 2025-02-17 VITALS
HEART RATE: 59 BPM | WEIGHT: 180 LBS | DIASTOLIC BLOOD PRESSURE: 86 MMHG | BODY MASS INDEX: 27.28 KG/M2 | TEMPERATURE: 97 F | RESPIRATION RATE: 18 BRPM | SYSTOLIC BLOOD PRESSURE: 146 MMHG | OXYGEN SATURATION: 96 % | HEIGHT: 68 IN

## 2025-02-17 DIAGNOSIS — F41.9 ANXIETY: Primary | ICD-10-CM

## 2025-02-17 DIAGNOSIS — G47.00 INSOMNIA, UNSPECIFIED TYPE: ICD-10-CM

## 2025-02-17 DIAGNOSIS — F51.01 PRIMARY INSOMNIA: ICD-10-CM

## 2025-02-17 LAB
ALBUMIN SERPL BCP-MCNC: 4.5 G/DL (ref 3.4–5)
ALP SERPL-CCNC: 67 U/L (ref 33–120)
ALT SERPL W P-5'-P-CCNC: 20 U/L (ref 10–52)
AMPHETAMINES UR QL SCN: NORMAL
ANION GAP SERPL CALC-SCNC: 10 MMOL/L (ref 10–20)
APAP SERPL-MCNC: <10 UG/ML
APPEARANCE UR: CLEAR
AST SERPL W P-5'-P-CCNC: 19 U/L (ref 9–39)
BARBITURATES UR QL SCN: NORMAL
BASOPHILS # BLD AUTO: 0.05 X10*3/UL (ref 0–0.1)
BASOPHILS NFR BLD AUTO: 0.7 %
BENZODIAZ UR QL SCN: NORMAL
BILIRUB SERPL-MCNC: 0.4 MG/DL (ref 0–1.2)
BILIRUB UR STRIP.AUTO-MCNC: NEGATIVE MG/DL
BUN SERPL-MCNC: 19 MG/DL (ref 6–23)
BZE UR QL SCN: NORMAL
CALCIUM SERPL-MCNC: 9 MG/DL (ref 8.6–10.3)
CANNABINOIDS UR QL SCN: NORMAL
CAOX CRY #/AREA UR COMP ASSIST: ABNORMAL /HPF
CHLORIDE SERPL-SCNC: 101 MMOL/L (ref 98–107)
CO2 SERPL-SCNC: 30 MMOL/L (ref 21–32)
COLOR UR: YELLOW
CREAT SERPL-MCNC: 1 MG/DL (ref 0.5–1.3)
EGFRCR SERPLBLD CKD-EPI 2021: >90 ML/MIN/1.73M*2
EOSINOPHIL # BLD AUTO: 0.15 X10*3/UL (ref 0–0.7)
EOSINOPHIL NFR BLD AUTO: 2.2 %
ERYTHROCYTE [DISTWIDTH] IN BLOOD BY AUTOMATED COUNT: 12 % (ref 11.5–14.5)
ETHANOL SERPL-MCNC: <10 MG/DL
FENTANYL+NORFENTANYL UR QL SCN: NORMAL
GLUCOSE SERPL-MCNC: 97 MG/DL (ref 74–99)
GLUCOSE UR STRIP.AUTO-MCNC: NORMAL MG/DL
HCT VFR BLD AUTO: 46.2 % (ref 41–52)
HGB BLD-MCNC: 15.7 G/DL (ref 13.5–17.5)
IMM GRANULOCYTES # BLD AUTO: 0.01 X10*3/UL (ref 0–0.7)
IMM GRANULOCYTES NFR BLD AUTO: 0.1 % (ref 0–0.9)
KETONES UR STRIP.AUTO-MCNC: NEGATIVE MG/DL
LEUKOCYTE ESTERASE UR QL STRIP.AUTO: NEGATIVE
LYMPHOCYTES # BLD AUTO: 2.44 X10*3/UL (ref 1.2–4.8)
LYMPHOCYTES NFR BLD AUTO: 36.4 %
MCH RBC QN AUTO: 31.2 PG (ref 26–34)
MCHC RBC AUTO-ENTMCNC: 34 G/DL (ref 32–36)
MCV RBC AUTO: 92 FL (ref 80–100)
METHADONE UR QL SCN: NORMAL
MONOCYTES # BLD AUTO: 0.73 X10*3/UL (ref 0.1–1)
MONOCYTES NFR BLD AUTO: 10.9 %
MUCOUS THREADS #/AREA URNS AUTO: ABNORMAL /LPF
NEUTROPHILS # BLD AUTO: 3.32 X10*3/UL (ref 1.2–7.7)
NEUTROPHILS NFR BLD AUTO: 49.7 %
NITRITE UR QL STRIP.AUTO: NEGATIVE
NRBC BLD-RTO: 0 /100 WBCS (ref 0–0)
OPIATES UR QL SCN: NORMAL
OXYCODONE+OXYMORPHONE UR QL SCN: NORMAL
PCP UR QL SCN: NORMAL
PH UR STRIP.AUTO: 5.5 [PH]
PLATELET # BLD AUTO: 226 X10*3/UL (ref 150–450)
POTASSIUM SERPL-SCNC: 3.9 MMOL/L (ref 3.5–5.3)
PROT SERPL-MCNC: 7.1 G/DL (ref 6.4–8.2)
PROT UR STRIP.AUTO-MCNC: NORMAL MG/DL
RBC # BLD AUTO: 5.04 X10*6/UL (ref 4.5–5.9)
RBC # UR STRIP.AUTO: NEGATIVE MG/DL
RBC #/AREA URNS AUTO: ABNORMAL /HPF
SALICYLATES SERPL-MCNC: <3 MG/DL
SODIUM SERPL-SCNC: 137 MMOL/L (ref 136–145)
SP GR UR STRIP.AUTO: 1.03
UROBILINOGEN UR STRIP.AUTO-MCNC: NORMAL MG/DL
WBC # BLD AUTO: 6.7 X10*3/UL (ref 4.4–11.3)
WBC #/AREA URNS AUTO: ABNORMAL /HPF

## 2025-02-17 PROCEDURE — 36415 COLL VENOUS BLD VENIPUNCTURE: CPT | Performed by: STUDENT IN AN ORGANIZED HEALTH CARE EDUCATION/TRAINING PROGRAM

## 2025-02-17 PROCEDURE — 93005 ELECTROCARDIOGRAM TRACING: CPT

## 2025-02-17 PROCEDURE — 2500000001 HC RX 250 WO HCPCS SELF ADMINISTERED DRUGS (ALT 637 FOR MEDICARE OP): Performed by: STUDENT IN AN ORGANIZED HEALTH CARE EDUCATION/TRAINING PROGRAM

## 2025-02-17 PROCEDURE — 99285 EMERGENCY DEPT VISIT HI MDM: CPT | Performed by: STUDENT IN AN ORGANIZED HEALTH CARE EDUCATION/TRAINING PROGRAM

## 2025-02-17 PROCEDURE — 85025 COMPLETE CBC W/AUTO DIFF WBC: CPT | Performed by: STUDENT IN AN ORGANIZED HEALTH CARE EDUCATION/TRAINING PROGRAM

## 2025-02-17 PROCEDURE — 80320 DRUG SCREEN QUANTALCOHOLS: CPT | Performed by: STUDENT IN AN ORGANIZED HEALTH CARE EDUCATION/TRAINING PROGRAM

## 2025-02-17 PROCEDURE — 99284 EMERGENCY DEPT VISIT MOD MDM: CPT

## 2025-02-17 PROCEDURE — 81001 URINALYSIS AUTO W/SCOPE: CPT | Mod: 59 | Performed by: STUDENT IN AN ORGANIZED HEALTH CARE EDUCATION/TRAINING PROGRAM

## 2025-02-17 PROCEDURE — 80053 COMPREHEN METABOLIC PANEL: CPT | Performed by: STUDENT IN AN ORGANIZED HEALTH CARE EDUCATION/TRAINING PROGRAM

## 2025-02-17 PROCEDURE — 80307 DRUG TEST PRSMV CHEM ANLYZR: CPT | Performed by: STUDENT IN AN ORGANIZED HEALTH CARE EDUCATION/TRAINING PROGRAM

## 2025-02-17 RX ORDER — TRAZODONE HYDROCHLORIDE 50 MG/1
100 TABLET ORAL NIGHTLY PRN
Qty: 30 TABLET | Refills: 0 | Status: SHIPPED | OUTPATIENT
Start: 2025-02-17

## 2025-02-17 RX ORDER — HYDROXYZINE HYDROCHLORIDE 25 MG/1
25 TABLET, FILM COATED ORAL EVERY 6 HOURS PRN
Qty: 12 TABLET | Refills: 0 | Status: SHIPPED | OUTPATIENT
Start: 2025-02-17 | End: 2025-02-21 | Stop reason: SDUPTHER

## 2025-02-17 RX ORDER — HYDROXYZINE HYDROCHLORIDE 25 MG/1
25 TABLET, FILM COATED ORAL ONCE
Status: COMPLETED | OUTPATIENT
Start: 2025-02-17 | End: 2025-02-17

## 2025-02-17 RX ORDER — TRAZODONE HYDROCHLORIDE 50 MG/1
100 TABLET ORAL NIGHTLY
Status: DISCONTINUED | OUTPATIENT
Start: 2025-02-17 | End: 2025-02-17 | Stop reason: HOSPADM

## 2025-02-17 RX ADMIN — HYDROXYZINE HYDROCHLORIDE 25 MG: 25 TABLET, FILM COATED ORAL at 21:30

## 2025-02-17 SDOH — HEALTH STABILITY: MENTAL HEALTH: ACTIVE SUICIDAL IDEATION WITH SOME INTENT TO ACT, WITHOUT SPECIFIC PLAN (PAST 1 MONTH): NO

## 2025-02-17 SDOH — HEALTH STABILITY: MENTAL HEALTH: DEPRESSION SYMPTOMS: CHANGE IN ENERGY LEVEL;ISOLATIVE;LOSS OF INTEREST;SLEEP DISTURBANCE

## 2025-02-17 SDOH — ECONOMIC STABILITY: HOUSING INSECURITY: FEELS SAFE LIVING IN HOME: YES

## 2025-02-17 SDOH — HEALTH STABILITY: MENTAL HEALTH: WISH TO BE DEAD (PAST 1 MONTH): YES

## 2025-02-17 SDOH — HEALTH STABILITY: MENTAL HEALTH: NON-SPECIFIC ACTIVE SUICIDAL THOUGHTS (PAST 1 MONTH): YES

## 2025-02-17 SDOH — HEALTH STABILITY: MENTAL HEALTH: FOR HIGH RISK PATIENTS: 1:1 PATIENT OBSERVER AT ALL TIMES

## 2025-02-17 SDOH — HEALTH STABILITY: MENTAL HEALTH: IN THE PAST WEEK, HAVE YOU BEEN HAVING THOUGHTS ABOUT KILLING YOURSELF?: NO

## 2025-02-17 SDOH — HEALTH STABILITY: MENTAL HEALTH: BEHAVIORAL HEALTH(WDL): WITHIN DEFINED LIMITS

## 2025-02-17 SDOH — HEALTH STABILITY: MENTAL HEALTH: IN THE PAST FEW WEEKS, HAVE YOU WISHED YOU WERE DEAD?: YES

## 2025-02-17 SDOH — HEALTH STABILITY: MENTAL HEALTH: HAVE YOU EVER TRIED TO KILL YOURSELF?: NO

## 2025-02-17 SDOH — HEALTH STABILITY: MENTAL HEALTH: SUICIDAL BEHAVIOR (LIFETIME): NO

## 2025-02-17 SDOH — HEALTH STABILITY: MENTAL HEALTH: ARE YOU HAVING THOUGHTS OF KILLING YOURSELF RIGHT NOW?: NO

## 2025-02-17 SDOH — HEALTH STABILITY: MENTAL HEALTH: ACTIVE SUICIDAL IDEATION WITH SPECIFIC PLAN AND INTENT (PAST 1 MONTH): NO

## 2025-02-17 SDOH — HEALTH STABILITY: MENTAL HEALTH: IN THE PAST FEW WEEKS, HAVE YOU FELT THAT YOU OR YOUR FAMILY WOULD BE BETTER OFF IF YOU WERE DEAD?: YES

## 2025-02-17 SDOH — ECONOMIC STABILITY: GENERAL

## 2025-02-17 SDOH — HEALTH STABILITY: MENTAL HEALTH: ANXIETY SYMPTOMS: GENERALIZED;PANIC ATTACK

## 2025-02-17 ASSESSMENT — COLUMBIA-SUICIDE SEVERITY RATING SCALE - C-SSRS
6. HAVE YOU EVER DONE ANYTHING, STARTED TO DO ANYTHING, OR PREPARED TO DO ANYTHING TO END YOUR LIFE?: NO
6. HAVE YOU EVER DONE ANYTHING, STARTED TO DO ANYTHING, OR PREPARED TO DO ANYTHING TO END YOUR LIFE?: NO
2. HAVE YOU ACTUALLY HAD ANY THOUGHTS OF KILLING YOURSELF?: YES
5. HAVE YOU STARTED TO WORK OUT OR WORKED OUT THE DETAILS OF HOW TO KILL YOURSELF? DO YOU INTEND TO CARRY OUT THIS PLAN?: NO
4. HAVE YOU HAD THESE THOUGHTS AND HAD SOME INTENTION OF ACTING ON THEM?: NO
1. SINCE LAST CONTACT, HAVE YOU WISHED YOU WERE DEAD OR WISHED YOU COULD GO TO SLEEP AND NOT WAKE UP?: NO
1. IN THE PAST MONTH, HAVE YOU WISHED YOU WERE DEAD OR WISHED YOU COULD GO TO SLEEP AND NOT WAKE UP?: YES
2. HAVE YOU ACTUALLY HAD ANY THOUGHTS OF KILLING YOURSELF?: YES
5. HAVE YOU STARTED TO WORK OUT OR WORKED OUT THE DETAILS OF HOW TO KILL YOURSELF? DO YOU INTEND TO CARRY OUT THIS PLAN?: NO

## 2025-02-17 ASSESSMENT — PAIN DESCRIPTION - LOCATION: LOCATION: BACK

## 2025-02-17 ASSESSMENT — LIFESTYLE VARIABLES
SUBSTANCE_ABUSE_PAST_12_MONTHS: NO
PRESCIPTION_ABUSE_PAST_12_MONTHS: YES

## 2025-02-17 ASSESSMENT — PAIN SCALES - GENERAL
PAINLEVEL_OUTOF10: 3
PAINLEVEL_OUTOF10: 0 - NO PAIN
PAINLEVEL_OUTOF10: 0 - NO PAIN

## 2025-02-17 ASSESSMENT — PAIN - FUNCTIONAL ASSESSMENT: PAIN_FUNCTIONAL_ASSESSMENT: 0-10

## 2025-02-17 NOTE — ED TRIAGE NOTES
"Pt to ed via private vehicle from home c/o anxiety, sleep deprivation, suicidal thoughts X 2 weeks. Per pt, thinks its an accumulation \"of life stuff\" over time that has caused him to feel this way. Pt recently prescribed duloxetine and trazodone, states he thinks the duloxetine made him suicidal because he had not felt that way prior to taking it. Per pt, last took duloxetine on Saturday, took trazodone last night. Pt also prescribed zoloft, stopped taking that as well. Per pt, doesn't see a psychiatrist currently but was scheduled to see one by PCP. Pt states not actively suicidal at this time, more concerned for anxiety and sleep deprivation.  "

## 2025-02-18 LAB
ATRIAL RATE: 62 BPM
HOLD SPECIMEN: NORMAL
P AXIS: 62 DEGREES
PR INTERVAL: 158 MS
Q ONSET: 251 MS
QRS COUNT: 11 BEATS
QRS DURATION: 102 MS
QT INTERVAL: 402 MS
QTC CALCULATION(BAZETT): 412 MS
QTC FREDERICIA: 408 MS
R AXIS: 78 DEGREES
T AXIS: 39 DEGREES
T OFFSET: 452 MS
VENTRICULAR RATE: 63 BPM

## 2025-02-18 NOTE — PROGRESS NOTES
"EPAT - Social Work Psychiatric Assessment    Arrival Details  Mode of Arrival: Ambulance  Admission Source: Emergency department  Admission Type: Voluntary  EPAT Assessment Start Date: 02/17/25  EPAT Assessment Start Time: 2035  Name of : KANA Hussein, ARON    History of Present Illness  Admission Reason: Psychiatric Eval  HPI: Report states \"Patient is a 35-year-old male, he has a past medical history of depression and anxiety, he is presenting to the emergency department for a psychiatric evaluation.  Patient reports that over the past several weeks he has had increasing anxiety, difficulty with sleep and sleep deprivation, some increasing depression and suicidal thoughts.  Has never acted on these.  He reports it is due to an accumulation of life things happening.  He reports increasing stress and anxiety related to these things.  He was initially on Zoloft but did not notice any improvement, was then switched to duloxetine and did not notice any improvement but noted that he started to feel worse and so stopped taking it.  Last took it on Saturday.  He also has been on trazodone at night which does not seem to be helping.  He does not currently have a psychiatrist, patient is not currently having any suicidal ideation at this time, is more concerned about anxiety and sleep.  He is presenting to the emergency department at the behest of family and friends due to their concern for the patient's mental and psychological wellbeing.  He denies any chest pain or shortness of breath, denies any palpitations, no vomiting, no diarrhea.  Has noticed some dry mouth, particularly when taking the trazodone.\" No MH provider or psych hospitalization hx, medication noncompliance per report; review med list below. Denies SI/self-harm hx.  UTOX normal, BAL normal. Low risk. Provider, triage, [place] and external notes reviewed.    SW Readmission Information   Readmission within 30 Days: No    Psychiatric " "Symptoms  Anxiety Symptoms: Generalized, Panic attack  Depression Symptoms: Change in energy level, Isolative, Loss of interest, Sleep disturbance  Daniella Symptoms: No problems reported or observed.    Psychosis Symptoms  Hallucination Type: No problems reported or observed. (Denies AVH/commands)  Delusion Type: No problems reported or observed.    Additional Symptoms - Adult  Generalized Anxiety Disorder: Difficult to control worry, Excessive anxiety/worry, Restlessness, Sleep disturbance  Obsessive Compulsive Disorder: No problems reported or observed.  Panic Attack: Other (Comment) (hEART PALPITATION)  Post Traumatic Stress Disorder: No problems reported or observed. (Denies)  Delirium: No problems reported or observed.  Review of Symptoms Comments: Please review above.    Past Psychiatric History/Meds/Treatments  Past Psychiatric History: No current MH provider or psych hospitalization hx, medication noncompliance per report; review med list below. Denies SI/self-harm hx.  Past Psychiatric Meds/Treatments: Chart states \"He was initially on Zoloft but did not notice any improvement, was then switched to duloxetine and did not notice any improvement but noted that he started to feel worse and so stopped taking it.  Last took it on Saturday.  He also has been on trazodone at night which does not seem to be helping.  He does not currently have a psychiatrist.\"  Past Violence/Victimization History: Denies    Current Mental Health Contacts   Name/Phone Number: Denies   Last Appointment Date: N/A  Provider Name/Phone Number: N/A  Provider Last Appointment Date: N/A    Support System: Church institution, Immediate family, Extended family, Friends    Living Arrangement: Lives with someone, House (wife,)    Home Safety  Feels Safe Living in Home: Yes  Potentially Unsafe Housing Conditions: Other (Comment) (Reports safe.)  Home Safety : Please review above.    Income Information  Employment Status " for: Patient  Employment Status: Employed  Income Source: Employed  Current/Previous Occupation: Other (Comment)  Income/Expense Information: Income meets expenses  Financial Concerns: None  Who Manages Finances if Patient Unable: Patient  Employment/ Finance Comments: Please review above.    [place] Service/Education History  Current or Previous  Service: None   Experience: Other (Comment) (None)  Education Level: Graduate school  History of Learning Problems: No  History of School Behavior Problems: No  School History: Please review above.    Social/Cultural History  Social History: Pt has a hx of depression and anxiety, and sleep deprevation per chart review. No guardian. Stressors: work, attempting to have children, attempting to fix people, and trying to do it all. Pt has a masters education.  Cultural Requests During Hospitalization: Denies  Spiritual Requests During Hospitalization: Jain  Important Activities: Family, Social    Legal  Legal Considerations: Other (Comment) (Denies)  Assistance with Managing/Advocating Healthcare Needs: Other (Comment)  Criminal Activity/ Legal Involvement Pertinent to Current Situation/ Hospitalization: Denies any concerns  Legal Concerns: Denies concerns.  Legal Comments: Denies concerns.    Drug Screening  Have you used any substances (canabis, cocaine, heroin, hallucinogens, inhalants, etc.) in the past 12 months?: No  Have you used any prescription drugs other than prescribed in the past 12 months?: Yes (per report stop taking them.)  Is a toxicology screen needed?: Yes         Behavioral Health  Behavioral Health(WDL): Within Defined Limits    Orientation  Orientation Level: Oriented X4    General Appearance  Motor Activity: Unremarkable  Speech Pattern: Excessively soft, Slow  General Attitude: Cooperative, Attentive, Pleasant, Interested  Appearance/Hygiene: Unremarkable    Thought Process  Coherency: Roy thinking  Content:  Unremarkable  Delusions: Other (Comment) (Denies)  Perception: Not altered  Hallucination: None  Judgment/Insight: Poor  Confusion: None  Cognition: Appropriate judgement, Appropriate attention/concentration, Appropriate safety awareness    Sleep Pattern  Sleep Pattern: Disturbed/interrupted sleep, Restlessness, Difficulty falling asleep (2-3 hours of sleep per report.)    Risk Factors  Self Harm/Suicidal Ideation Plan: No hx of SI, self harm. Report last week having SI thoughts, due to duloxetine. Pt reports to have stopped taking medication and have went back to regular thoughts. Pt states last week was the only time he has ever had thoughts in his life.  Previous Self Harm/Suicidal Plans: Denies.  Risk Factors: Male, Presence of firearms in home  Description of Thoughts/Ideas Leaving Unit Now: Pt is unsure what he needs at this time. Reports that he feels comfortable with discharge accompanied by MH resources and medication to assist with sleep and anxiety prior to leaving.    Violence Risk Assessment  Assessment of Violence: None noted  Thoughts of Harm to Others: No    Ability to Assess Risk Screen  Risk Screen - Ability to Assess: Able to be screened  Ask Suicide-Screening Questions  1. In the past few weeks, have you wished you were dead?: Yes  2. In the past few weeks, have you felt that you or your family would be better off if you were dead?: Yes  3. In the past week, have you been having thoughts about killing yourself?: No  4. Have you ever tried to kill yourself?: No  5. Are you having thoughts of killing yourself right now?: No  Calculated Risk Score: Potential Risk  [place] Suicide Severity Rating Scale (Screener/Recent Self-Report)  1. Wish to be Dead (Past 1 Month): Yes  2. Non-Specific Active Suicidal Thoughts (Past 1 Month): Yes  3. Active Suicidal Ideation with any Methods (Not Plan) Without Intent to Act (Past 1 Month): No  4. Active Suicidal Ideation with Some Intent to Act, Without Specific  "Plan (Past 1 Month): No  5. Active Suicidal Ideation with Specific Plan and Intent (Past 1 Month): No  6. Suicidal Behavior (Lifetime): No  Calculated C-SSRS Risk Score (Lifetime/Recent): Low Risk  Step 1: Risk Factors  Current & Past Psychiatric Dx: Mood disorder  Presenting Symptoms: Hopelessness or despair, Anxiety and/or panic  Family History: Other (Comment) (Denies)  Precipitants/Stressors: Other (Comment) (wanting to fix everything.)  Change in Treatment: Non-compliant or not receiving treatment  Access to Lethal Methods : Yes  Step 2: Protective Factors   Protective Factors Internal: Adventism beliefs, Identifies reasons for living, Ability to cope with stress  Protective Factors External: Cultural, spiritual and/or moral attitudes against suicide, Supportive social network or family or friends, Engaged in work or school  Step 3: Suicidal Ideation Intensity  Most Severe Suicidal Ideation Identified: N/A, denies phx.  How Many Times Have You Had These Thoughts: 2-5 times in a week  When You Have the Thoughts How Long do They Last : Less than 1 hour/some of the time  Could/Can You Stop Thinking About Killing Yourself or Wanting to Die if You Want to: Can control thoughts with some difficulty  Are There Things - Anyone or Anything - That Stopped You From Wanting to Die or Acting on: Deterrents definitely stopped you from attempting suicide  What Sort of Reasons Did You Have For Thinking About Wanting to Die or Killing Yourself: Does not apply  Total Score: 9  Step 5: Documentation  Risk Level: Low suicide risk    Psychiatric Impression and Plan of Care  Assessment and Plan: Upon assessment pt presented with low energy, calm, cooperative, engaged, AOx4. Pt reports \"well, to be blunt... I have been having sever anxiety attacks and sleep deprivation. I need an evaluation.\" Pt denies AVH/commands, SI/HI/self-harm, intent, plan,  status, legal/criminal concerns, reason for wanting to die, nightmares, trauma, " "substance use concerns, family SI hx. Pt stressors are \"work, attempts to have children, fix people, and trying to do everything.\" Pt report access to a gun, however states he resides with his wife and it is put away. Pt states that his mood is relaxed, sleeps 2-3 hours a day, panic attacks 2xs weekly, and energy level is lower. Pt states that he has never been hospitalized or had SI though, stating last week was the only time and it was associated to the duloxetine medication. Pt report taking himself of medication, and is interested in engaging in MH services. Pt reports reason to live as \"my lord, savior and Andrew,\" wife, family and parents.     Diagnosis: Depression  Specific Resources Provided to Patient: MH resources provided. Pt recommended for discharge.  CM Notified: N/A  PHP/IOP Recommended: N/A  Specific Information Provided for PHP/IOP: N/A  Plan Comments: Please review above.    Outcome/Disposition  Patient's Perception of Outcome Achieved: Pt is unsure what he needs at this time. Reports that he feels comfortable with discharge accompanied by MH resources and medication to assist with sleep and anxiety prior to leaving.  Assessment, Recommendations and Risk Level Reviewed with: Jean Spears MD  EPAT Assessment Completed Date: 02/17/25  EPAT Assessment Completed Time: 2148      "

## 2025-02-21 ENCOUNTER — TELEPHONE (OUTPATIENT)
Dept: PRIMARY CARE | Facility: CLINIC | Age: 35
End: 2025-02-21
Payer: COMMERCIAL

## 2025-02-21 DIAGNOSIS — F41.9 ANXIETY: ICD-10-CM

## 2025-02-21 RX ORDER — HYDROXYZINE HYDROCHLORIDE 25 MG/1
25 TABLET, FILM COATED ORAL EVERY 6 HOURS PRN
Qty: 60 TABLET | Refills: 0 | Status: SHIPPED | OUTPATIENT
Start: 2025-02-21 | End: 2025-03-08

## 2025-02-28 LAB
ATRIAL RATE: 62 BPM
P AXIS: 62 DEGREES
PR INTERVAL: 158 MS
Q ONSET: 251 MS
QRS COUNT: 11 BEATS
QRS DURATION: 102 MS
QT INTERVAL: 402 MS
QTC CALCULATION(BAZETT): 412 MS
QTC FREDERICIA: 408 MS
R AXIS: 78 DEGREES
T AXIS: 39 DEGREES
T OFFSET: 452 MS
VENTRICULAR RATE: 63 BPM

## 2025-03-03 DIAGNOSIS — F41.9 ANXIETY: ICD-10-CM

## 2025-03-12 ENCOUNTER — APPOINTMENT (OUTPATIENT)
Dept: PRIMARY CARE | Facility: CLINIC | Age: 35
End: 2025-03-12
Payer: COMMERCIAL

## 2025-06-24 ENCOUNTER — APPOINTMENT (OUTPATIENT)
Dept: PRIMARY CARE | Facility: CLINIC | Age: 35
End: 2025-06-24
Payer: COMMERCIAL

## 2025-06-24 VITALS
HEIGHT: 68 IN | OXYGEN SATURATION: 97 % | HEART RATE: 65 BPM | DIASTOLIC BLOOD PRESSURE: 74 MMHG | SYSTOLIC BLOOD PRESSURE: 119 MMHG | BODY MASS INDEX: 30.16 KG/M2 | WEIGHT: 199 LBS

## 2025-06-24 DIAGNOSIS — Z00.00 HEALTH CARE MAINTENANCE: Primary | ICD-10-CM

## 2025-06-24 PROBLEM — F41.9 ANXIETY: Status: ACTIVE | Noted: 2025-06-24

## 2025-06-24 PROCEDURE — 1036F TOBACCO NON-USER: CPT | Performed by: INTERNAL MEDICINE

## 2025-06-24 PROCEDURE — G8433 SCR FOR DEP NOT CPT DOC RSN: HCPCS | Performed by: INTERNAL MEDICINE

## 2025-06-24 PROCEDURE — 3008F BODY MASS INDEX DOCD: CPT | Performed by: INTERNAL MEDICINE

## 2025-06-24 PROCEDURE — 99395 PREV VISIT EST AGE 18-39: CPT | Performed by: INTERNAL MEDICINE

## 2025-06-24 RX ORDER — DOXEPIN 3 MG/1
3 TABLET, FILM COATED ORAL NIGHTLY
COMMUNITY
Start: 2025-05-29

## 2025-06-24 RX ORDER — PROPRANOLOL HYDROCHLORIDE 10 MG/1
10 TABLET ORAL 2 TIMES DAILY PRN
COMMUNITY
Start: 2025-03-04

## 2025-06-24 NOTE — PATIENT INSTRUCTIONS
We kindly ask that you take the lead and scheduling your referral appointments to ensure they align best with your availability by calling [phone].  For laboratory tests we encourage you to schedule an appointment online [email or web address] but walk-ins are available as well.  For radiology testing you can call [phone] or [phone] to schedule.  If for any reason you are having difficulty scheduling your appointments please feel free to reach out at our office by calling [phone] to assist further

## 2025-06-24 NOTE — PROGRESS NOTES
"Subjective   Patient ID: Arian Banks is a 35 y.o. male who presents for Annual Exam.        HPI     Patient is a 35-year-old male with past medical history of anxiety who presents for wellness.  No specific complaints at this time.  He needs paperwork for adoption/foster care.  He follows with psychiatry on a regular basis and now on hydroxyzine and propranolol as needed as well as doxepin at night.  No longer on duloxetine or trazodone.  Overall doing well.  No concerns today.  Denies illicit substances or smoking.  Alcohol occasionally.  Monogamous and concern for STDs.  Enjoys his work.      Review of Systems  Constitutional: No fever or chills, No Night Sweats  Eyes: No Blurry Vision or Eye sight problems  ENT: No Nasal Discharge, Hoarseness, sore throat  Cardiovascular: no chest pain, no palpitations and no syncope.   Respiratory: no cough, no shortness of breath during exertion and no shortness of breath at rest.   Gastrointestinal: no abdominal pain, no nausea and no vomiting.   : No issues with urinary stream, burning with urination, no blood in urine or stools  Skin: No Skin rashes or Lesions  Neuro: No Headache, no dizziness or Numbness or tingling  Psych: No Anxiety, depression or sleeping problems  Heme: No Easy bleeding or brusing.     Objective   /74   Pulse 65   Ht 1.727 m (5' 8\")   Wt 90.3 kg (199 lb)   SpO2 97%   BMI 30.26 kg/m²     Physical Exam  Constitutional: Alert and in no acute distress. Well developed, well nourished.   Head and Face: Head and face: Normal.    Eyes: Normal external exam. Pupils were equal in size, round, reactive to light (PERRL) with normal accommodation and extraocular movements intact (EOMI).   Ears, Nose, Mouth, and Throat: External inspection of ears and nose: Normal.  Hearing: Normal.  Nasal mucosa, septum, and turbinates: Normal.  Lips, teeth, and gums: Normal.  Oropharynx: Normal.   Neck: No neck mass was observed. Supple. Thyroid not enlarged and " there were no palpable thyroid nodules.   Cardiovascular: Heart rate and rhythm were normal, normal S1 and S2. Pedal pulses: Normal. No peripheral edema.   Pulmonary: No respiratory distress. Clear bilateral breath sounds.   Abdomen: Soft nontender; no abdominal mass palpated. Normal bowel sounds. No organomegaly.   : Deferred  Musculoskeletal: No joint swelling seen, normal movements of all extremities. Range of motion: Normal.  Muscle strength/tone: Normal.    Skin: Normal skin color and pigmentation, normal skin turgor, and no rash.   Neurologic: Deep tendon reflexes were 2+ and symmetric.   Psychiatric: Judgment and insight: Intact. Mood and affect: Normal.  Lymphatic: No cervical lymphadenopathy. Palpation of lymph nodes in axillae: Normal.  Palpation of lymph nodes in groin: Normal.    Lab Results   Component Value Date    WBC 6.7 02/17/2025    HGB 15.7 02/17/2025    HCT 46.2 02/17/2025     02/17/2025    ALT 20 02/17/2025    AST 19 02/17/2025     02/17/2025    K 3.9 02/17/2025     02/17/2025    CREATININE 1.00 02/17/2025    BUN 19 02/17/2025    CO2 30 02/17/2025       ECG 12 lead  Sinus rhythm  Baseline wander in lead(s) V1,V2    See ED provider note for full interpretation and clinical correlation  Confirmed by Selene Groves ([id number]) on 2/28/2025 10:03:11 AM      Assessment/Plan   Problem List Items Addressed This Visit    None  Visit Diagnoses         Codes      Health care maintenance    -  Primary Z00.00    Relevant Orders    CBC    Comprehensive metabolic panel    Lipid Panel    TSH with reflex to Free T4 if abnormal          Continue following with psychiatry for treatment of anxiety.  Symptoms appear to be well-controlled at this time.  Will fill out forms related to foster and adoptive care.    Dear Airan Banks     It was my pleasure to take care of you today in the office. Below are the things we discussed today:    1. Immunizations: Yearly Flu shot is recommended.       2.  Blood Work: Ordered  3. Seen your dentist twice a year  4. Yearly Eye exam is recommended    5. BMI: 30.3  6: Diet recommendations:   Eat Clean, Try to have as many home cooked meals as possible  Avoid processed foods which contain excess calories, sugar, and sodium.    7. Exercise recommendations:   150 minutes a week to maintain your weight     If you have to loose weight, you need a better diet and exercise plan.     8. Please get your Living will / Advance directive completed if you do not have one already. Please make sure our office has a copy of the latest one.     9. Colonoscopy: Start at age 45  10. PSA: Start at age 45    11.  Follow-up annually or as needed    Follow up in one year for a Physical. Please call the office before your Physical to see if you need blood work completed prior to your physical.     Please call me if any questions arise from now until your next visit. I will call you after I am done seeing patients. A Doctor is always available by phone when the office is closed. Please feel free to call for help with any problem that you feel shouldn't wait until the office re-opens.

## 2025-07-29 ENCOUNTER — APPOINTMENT (OUTPATIENT)
Dept: UROLOGY | Facility: HOSPITAL | Age: 35
End: 2025-07-29
Payer: COMMERCIAL

## 2025-08-04 ENCOUNTER — APPOINTMENT (OUTPATIENT)
Dept: UROLOGY | Facility: HOSPITAL | Age: 35
End: 2025-08-04
Payer: COMMERCIAL